# Patient Record
Sex: MALE | Race: WHITE | URBAN - METROPOLITAN AREA
[De-identification: names, ages, dates, MRNs, and addresses within clinical notes are randomized per-mention and may not be internally consistent; named-entity substitution may affect disease eponyms.]

---

## 2023-07-05 ENCOUNTER — EMERGENCY (EMERGENCY)
Facility: HOSPITAL | Age: 88
LOS: 0 days | Discharge: HOME | End: 2023-07-05
Attending: STUDENT IN AN ORGANIZED HEALTH CARE EDUCATION/TRAINING PROGRAM
Payer: MEDICARE

## 2023-07-05 VITALS
HEART RATE: 75 BPM | SYSTOLIC BLOOD PRESSURE: 181 MMHG | DIASTOLIC BLOOD PRESSURE: 85 MMHG | TEMPERATURE: 98 F | HEIGHT: 71 IN | OXYGEN SATURATION: 99 % | RESPIRATION RATE: 18 BRPM | WEIGHT: 190.04 LBS

## 2023-07-05 DIAGNOSIS — E78.5 HYPERLIPIDEMIA, UNSPECIFIED: ICD-10-CM

## 2023-07-05 DIAGNOSIS — I10 ESSENTIAL (PRIMARY) HYPERTENSION: ICD-10-CM

## 2023-07-05 DIAGNOSIS — M79.641 PAIN IN RIGHT HAND: ICD-10-CM

## 2023-07-05 DIAGNOSIS — Z23 ENCOUNTER FOR IMMUNIZATION: ICD-10-CM

## 2023-07-05 DIAGNOSIS — M89.8X3 OTHER SPECIFIED DISORDERS OF BONE, FOREARM: ICD-10-CM

## 2023-07-05 DIAGNOSIS — L03.113 CELLULITIS OF RIGHT UPPER LIMB: ICD-10-CM

## 2023-07-05 LAB
ALBUMIN SERPL ELPH-MCNC: 4.5 G/DL — SIGNIFICANT CHANGE UP (ref 3.5–5.2)
ALP SERPL-CCNC: 78 U/L — SIGNIFICANT CHANGE UP (ref 30–115)
ALT FLD-CCNC: 16 U/L — SIGNIFICANT CHANGE UP (ref 0–41)
ANION GAP SERPL CALC-SCNC: 9 MMOL/L — SIGNIFICANT CHANGE UP (ref 7–14)
AST SERPL-CCNC: 20 U/L — SIGNIFICANT CHANGE UP (ref 0–41)
BILIRUB SERPL-MCNC: 0.6 MG/DL — SIGNIFICANT CHANGE UP (ref 0.2–1.2)
BUN SERPL-MCNC: 22 MG/DL — HIGH (ref 10–20)
CALCIUM SERPL-MCNC: 9.9 MG/DL — SIGNIFICANT CHANGE UP (ref 8.4–10.5)
CHLORIDE SERPL-SCNC: 109 MMOL/L — SIGNIFICANT CHANGE UP (ref 98–110)
CO2 SERPL-SCNC: 23 MMOL/L — SIGNIFICANT CHANGE UP (ref 17–32)
CREAT SERPL-MCNC: 1 MG/DL — SIGNIFICANT CHANGE UP (ref 0.7–1.5)
EGFR: 73 ML/MIN/1.73M2 — SIGNIFICANT CHANGE UP
GLUCOSE SERPL-MCNC: 104 MG/DL — HIGH (ref 70–99)
HCT VFR BLD CALC: 42.1 % — SIGNIFICANT CHANGE UP (ref 42–52)
HGB BLD-MCNC: 14 G/DL — SIGNIFICANT CHANGE UP (ref 14–18)
MCHC RBC-ENTMCNC: 30.4 PG — SIGNIFICANT CHANGE UP (ref 27–31)
MCHC RBC-ENTMCNC: 33.3 G/DL — SIGNIFICANT CHANGE UP (ref 32–37)
MCV RBC AUTO: 91.5 FL — SIGNIFICANT CHANGE UP (ref 80–94)
NRBC # BLD: 0 /100 WBCS — SIGNIFICANT CHANGE UP (ref 0–0)
PLATELET # BLD AUTO: 190 K/UL — SIGNIFICANT CHANGE UP (ref 130–400)
PMV BLD: 10.8 FL — HIGH (ref 7.4–10.4)
POTASSIUM SERPL-MCNC: 4.9 MMOL/L — SIGNIFICANT CHANGE UP (ref 3.5–5)
POTASSIUM SERPL-SCNC: 4.9 MMOL/L — SIGNIFICANT CHANGE UP (ref 3.5–5)
PROT SERPL-MCNC: 6.7 G/DL — SIGNIFICANT CHANGE UP (ref 6–8)
RBC # BLD: 4.6 M/UL — LOW (ref 4.7–6.1)
RBC # FLD: 14.1 % — SIGNIFICANT CHANGE UP (ref 11.5–14.5)
SODIUM SERPL-SCNC: 141 MMOL/L — SIGNIFICANT CHANGE UP (ref 135–146)
WBC # BLD: 8.05 K/UL — SIGNIFICANT CHANGE UP (ref 4.8–10.8)
WBC # FLD AUTO: 8.05 K/UL — SIGNIFICANT CHANGE UP (ref 4.8–10.8)

## 2023-07-05 PROCEDURE — 90715 TDAP VACCINE 7 YRS/> IM: CPT | Mod: GY

## 2023-07-05 PROCEDURE — 36415 COLL VENOUS BLD VENIPUNCTURE: CPT

## 2023-07-05 PROCEDURE — 99284 EMERGENCY DEPT VISIT MOD MDM: CPT | Mod: FS

## 2023-07-05 PROCEDURE — 73130 X-RAY EXAM OF HAND: CPT | Mod: RT

## 2023-07-05 PROCEDURE — 80053 COMPREHEN METABOLIC PANEL: CPT

## 2023-07-05 PROCEDURE — 90471 IMMUNIZATION ADMIN: CPT

## 2023-07-05 PROCEDURE — 99283 EMERGENCY DEPT VISIT LOW MDM: CPT | Mod: 25

## 2023-07-05 PROCEDURE — 85027 COMPLETE CBC AUTOMATED: CPT

## 2023-07-05 PROCEDURE — 73130 X-RAY EXAM OF HAND: CPT | Mod: 26,RT

## 2023-07-05 RX ORDER — TETANUS TOXOID, REDUCED DIPHTHERIA TOXOID AND ACELLULAR PERTUSSIS VACCINE, ADSORBED 5; 2.5; 8; 8; 2.5 [IU]/.5ML; [IU]/.5ML; UG/.5ML; UG/.5ML; UG/.5ML
0.5 SUSPENSION INTRAMUSCULAR ONCE
Refills: 0 | Status: COMPLETED | OUTPATIENT
Start: 2023-07-05 | End: 2023-07-05

## 2023-07-05 RX ORDER — CEPHALEXIN 500 MG
1 CAPSULE ORAL
Qty: 28 | Refills: 0
Start: 2023-07-05 | End: 2023-07-11

## 2023-07-05 RX ADMIN — TETANUS TOXOID, REDUCED DIPHTHERIA TOXOID AND ACELLULAR PERTUSSIS VACCINE, ADSORBED 0.5 MILLILITER(S): 5; 2.5; 8; 8; 2.5 SUSPENSION INTRAMUSCULAR at 13:27

## 2023-07-05 NOTE — ED PROVIDER NOTE - CARE PROVIDER_API CALL
Sadi Martin  Orthopaedic Surgery  [address] [place] [address]  Phone: [phone]  Fax: [phone]  Follow Up Time:

## 2023-07-05 NOTE — ED PROVIDER NOTE - PHYSICAL EXAMINATION
CONST: Well appearing in NAD  CARD: Normal S1 S2; Normal rate and rhythm  RESP: Equal BS B/L, No wheezes, rhonchi or rales. No distress  MS: Normal ROM in all extremities.   SKIN: Mild erythema and swelling to dorsum of R hand adjacent to a well healing linear abrasion. No fluctuance. No lymphangitis  NEURO: A&Ox3, No focal deficits. Strength 5/5 with no sensory deficits.

## 2023-07-05 NOTE — ED PROVIDER NOTE - ATTENDING APP SHARED VISIT CONTRIBUTION OF CARE
88-year-old male with a past medical history significant for hypertension and hyperlipidemia who presents with right hand pain.  Patient states that approximately 3 days ago he cut the radial dorsum of the right hands with a metal object.  Yesterday noticed some swelling and redness to the area.  However patient denies any discharge numbness tingling fevers chills or any other medical complaints.    VITAL SIGNS: I have reviewed nursing notes and confirm.  CONSTITUTIONAL: non-toxic, well appearing  SKIN: no rash, no petechiae.  EYES: EOMI, pink conjunctiva, anicteric  ENT: tongue midline, no exudates, MMM  NECK: Supple; no meningismus, no JVD  RESP:no respiratory distress  EXT: Normal ROM x4. No edema. No calves tenderness . RUE: there is a healing wound to the dorsum of the hand, redness noted, however, no crepitus/streaking or fluctuance. full rom at the digits, wrist, elbow, shoulder. radial pulse +2   NEURO: Alert, oriented. CN2-12 intact, equal strength bilaterally, nl gait.  PSYCH: Cooperative, appropriate.    88-year-old male that presents with right hand pain concern for cellulitis no streaking.  Will obtain labs and imaging.  Patient instructed to return in 48 hours for wound check will DC with p.o. antibiotics.

## 2023-07-05 NOTE — ED PROVIDER NOTE - PATIENT PORTAL LINK FT
You can access the FollowMyHealth Patient Portal offered by [facility] by registering at the following website: [email or web address]. By joining [employer]’s FollowMyHealth portal, you will also be able to view your health information using other applications (apps) compatible with our system.

## 2023-07-05 NOTE — ED PROVIDER NOTE - NSFOLLOWUPINSTRUCTIONS_ED_ALL_ED_FT
PLEASE RETURN IN 48 HOURS FOR WOUND CHECK. CAN ASK FOR DR. SAINZ.     Cellulitis    Cellulitis is a skin infection caused by bacteria. This condition occurs most often in the arms and lower legs but can occur anywhere over the body. Symptoms include redness, swelling, warm skin, tenderness, and chills/fever. If you were prescribed an antibiotic medicine, take it as told by your health care provider. Do not stop taking the antibiotic even if you start to feel better.    SEEK IMMEDIATE MEDICAL CARE IF YOU HAVE ANY OF THE FOLLOWING SYMPTOMS: worsening fever, red streaks coming from affected area, vomiting or diarrhea, or dizziness/lightheadedness. or if symptoms dont improve

## 2023-07-05 NOTE — ED PROVIDER NOTE - CLINICAL SUMMARY MEDICAL DECISION MAKING FREE TEXT BOX
88-year-old male that presents with right hand pain concern for cellulitis no streaking.  Will obtain labs and imaging.  Patient instructed to return in 48 hours for wound check will DC with p.o. antibiotics. Labs were ordered and reviewed.  Imaging was ordered and reviewed by me.  Appropriate medications for patient's presenting complaints were ordered and effects were reassessed.  Patient's records (prior hospital, ED visit, and/or nursing home notes if available) were reviewed.  Additional history was obtained from EMS, family, and/or PCP (where available).  Escalation to admission/observation was considered.   However patient feels much better and is comfortable with discharge.  Appropriate follow-up was arranged.     I have discussed the discharge plan with the patient. The patient agrees with the plan, as discussed.  The patient understands Emergency Department diagnosis is a preliminary diagnosis often based on limited information and that the patient must adhere to the follow-up plan as discussed.  The patient understands that if the symptoms worsen or if prescribed medications do not have the desired/planned effect that the patient may return to the Emergency Department at any time for further evaluation and treatment.

## 2023-07-05 NOTE — ED PROVIDER NOTE - OBJECTIVE STATEMENT
88-year-old male with a past medical history significant for hypertension and hyperlipidemia who presents with right hand pain.  Patient states that approximately 3 days ago he cut the back of his R hand with a metal object while outside working in yard. Yesterday noticed mild swelling and redness to the area and presented today for evaluation for possible infection. He is L hand dominant. Reports FROM of R hand and no pain upon grasp or palpation.  However patient denies any discharge numbness tingling fevers chills or any other medical complaints.

## 2023-07-07 ENCOUNTER — EMERGENCY (EMERGENCY)
Facility: HOSPITAL | Age: 88
LOS: 0 days | Discharge: HOME | End: 2023-07-07
Attending: STUDENT IN AN ORGANIZED HEALTH CARE EDUCATION/TRAINING PROGRAM
Payer: MEDICARE

## 2023-07-07 VITALS
OXYGEN SATURATION: 98 % | SYSTOLIC BLOOD PRESSURE: 135 MMHG | RESPIRATION RATE: 18 BRPM | DIASTOLIC BLOOD PRESSURE: 83 MMHG | TEMPERATURE: 98 F | HEART RATE: 75 BPM | WEIGHT: 190.04 LBS | HEIGHT: 71 IN

## 2023-07-07 DIAGNOSIS — M79.89 OTHER SPECIFIED SOFT TISSUE DISORDERS: ICD-10-CM

## 2023-07-07 DIAGNOSIS — E78.5 HYPERLIPIDEMIA, UNSPECIFIED: ICD-10-CM
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
## 2023-07-07 DIAGNOSIS — S61.401A UNSPECIFIED OPEN WOUND OF RIGHT HAND, INITIAL ENCOUNTER: ICD-10-CM

## 2023-07-07 DIAGNOSIS — I10 ESSENTIAL (PRIMARY) HYPERTENSION: ICD-10-CM

## 2023-07-07 LAB
ANION GAP SERPL CALC-SCNC: 10 MMOL/L — SIGNIFICANT CHANGE UP (ref 7–14)
BASE EXCESS BLDV CALC-SCNC: -2.4 MMOL/L — LOW (ref -2–3)
BUN SERPL-MCNC: 22 MG/DL — HIGH (ref 10–20)
CA-I SERPL-SCNC: 1.28 MMOL/L — SIGNIFICANT CHANGE UP (ref 1.15–1.33)
CALCIUM SERPL-MCNC: 10.2 MG/DL — SIGNIFICANT CHANGE UP (ref 8.4–10.5)
CHLORIDE SERPL-SCNC: 107 MMOL/L — SIGNIFICANT CHANGE UP (ref 98–110)
CO2 SERPL-SCNC: 22 MMOL/L — SIGNIFICANT CHANGE UP (ref 17–32)
CREAT SERPL-MCNC: 1.4 MG/DL — SIGNIFICANT CHANGE UP (ref 0.7–1.5)
EGFR: 49 ML/MIN/1.73M2 — LOW
GAS PNL BLDV: 136 MMOL/L — SIGNIFICANT CHANGE UP (ref 136–145)
GAS PNL BLDV: SIGNIFICANT CHANGE UP
GLUCOSE SERPL-MCNC: 102 MG/DL — HIGH (ref 70–99)
HCO3 BLDV-SCNC: 23 MMOL/L — SIGNIFICANT CHANGE UP (ref 22–29)
HCT VFR BLD CALC: 43.2 % — SIGNIFICANT CHANGE UP (ref 42–52)
HCT VFR BLDA CALC: 44 % — SIGNIFICANT CHANGE UP (ref 39–51)
HGB BLD CALC-MCNC: 14.7 G/DL — SIGNIFICANT CHANGE UP (ref 12.6–17.4)
HGB BLD-MCNC: 14.3 G/DL — SIGNIFICANT CHANGE UP (ref 14–18)
LACTATE BLDV-MCNC: 0.9 MMOL/L — SIGNIFICANT CHANGE UP (ref 0.5–2)
MCHC RBC-ENTMCNC: 30.6 PG — SIGNIFICANT CHANGE UP (ref 27–31)
MCHC RBC-ENTMCNC: 33.1 G/DL — SIGNIFICANT CHANGE UP (ref 32–37)
MCV RBC AUTO: 92.5 FL — SIGNIFICANT CHANGE UP (ref 80–94)
NRBC # BLD: 0 /100 WBCS — SIGNIFICANT CHANGE UP (ref 0–0)
PCO2 BLDV: 42 MMHG — SIGNIFICANT CHANGE UP (ref 42–55)
PH BLDV: 7.35 — SIGNIFICANT CHANGE UP (ref 7.32–7.43)
PLATELET # BLD AUTO: 201 K/UL — SIGNIFICANT CHANGE UP (ref 130–400)
PMV BLD: 10.7 FL — HIGH (ref 7.4–10.4)
PO2 BLDV: 35 MMHG — SIGNIFICANT CHANGE UP
POTASSIUM BLDV-SCNC: 4.7 MMOL/L — SIGNIFICANT CHANGE UP (ref 3.5–5.1)
POTASSIUM SERPL-MCNC: 5.4 MMOL/L — HIGH (ref 3.5–5)
POTASSIUM SERPL-SCNC: 5.4 MMOL/L — HIGH (ref 3.5–5)
RBC # BLD: 4.67 M/UL — LOW (ref 4.7–6.1)
RBC # FLD: 14 % — SIGNIFICANT CHANGE UP (ref 11.5–14.5)
SAO2 % BLDV: 54.4 % — SIGNIFICANT CHANGE UP
SODIUM SERPL-SCNC: 139 MMOL/L — SIGNIFICANT CHANGE UP (ref 135–146)
WBC # BLD: 8.69 K/UL — SIGNIFICANT CHANGE UP (ref 4.8–10.8)
WBC # FLD AUTO: 8.69 K/UL — SIGNIFICANT CHANGE UP (ref 4.8–10.8)

## 2023-07-07 PROCEDURE — 73130 X-RAY EXAM OF HAND: CPT | Mod: RT

## 2023-07-07 PROCEDURE — 73130 X-RAY EXAM OF HAND: CPT | Mod: 26,RT

## 2023-07-07 PROCEDURE — 80048 BASIC METABOLIC PNL TOTAL CA: CPT

## 2023-07-07 PROCEDURE — 36415 COLL VENOUS BLD VENIPUNCTURE: CPT

## 2023-07-07 PROCEDURE — 99284 EMERGENCY DEPT VISIT MOD MDM: CPT | Mod: 25

## 2023-07-07 PROCEDURE — 82330 ASSAY OF CALCIUM: CPT

## 2023-07-07 PROCEDURE — 85018 HEMOGLOBIN: CPT

## 2023-07-07 PROCEDURE — 99285 EMERGENCY DEPT VISIT HI MDM: CPT

## 2023-07-07 PROCEDURE — 84295 ASSAY OF SERUM SODIUM: CPT

## 2023-07-07 PROCEDURE — 84132 ASSAY OF SERUM POTASSIUM: CPT

## 2023-07-07 PROCEDURE — 85027 COMPLETE CBC AUTOMATED: CPT

## 2023-07-07 PROCEDURE — 85014 HEMATOCRIT: CPT

## 2023-07-07 PROCEDURE — 83605 ASSAY OF LACTIC ACID: CPT

## 2023-07-07 PROCEDURE — 82803 BLOOD GASES ANY COMBINATION: CPT

## 2023-07-07 NOTE — ED PROVIDER NOTE - CLINICAL SUMMARY MEDICAL DECISION MAKING FREE TEXT BOX
88-year-old male who presents for wound check.   Will obtain imaging labs hand consult. pt evaluated and cleared by hand consult. Labs were ordered and reviewed.  Imaging was ordered and reviewed by me.  Appropriate medications for patient's presenting complaints were ordered and effects were reassessed.  Patient's records (prior hospital, ED visit, and/or nursing home notes if available) were reviewed.  Additional history was obtained from EMS, family, and/or PCP (where available).  Escalation to admission/observation was considered.  However patient feels much better and is comfortable with discharge.  Appropriate follow-up was arranged.    I have discussed the discharge plan with the patient. The patient agrees with the plan, as discussed.  The patient understands Emergency Department diagnosis is a preliminary diagnosis often based on limited information and that the patient must adhere to the follow-up plan as discussed.  The patient understands that if the symptoms worsen or if prescribed medications do not have the desired/planned effect that the patient may return to the Emergency Department at any time for further evaluation and treatment.

## 2023-07-07 NOTE — ED PROVIDER NOTE - CARE PROVIDER_API CALL
Javier Azevedo  Plastic Surgery  [address] [place] [address]  Phone: [phone]  Fax: [phone]  Follow Up Time: 4-6 Days

## 2023-07-07 NOTE — ED PROVIDER NOTE - ATTENDING CONTRIBUTION TO CARE
88-year-old male with a past medical history significant for hypertension hyperlipidemia who presents for wound follow-up.  Patient was seen on July 5 as he was having pain to the right hands and there was concern for cellulitis.  Patient returns for wound follow-up.  Patient denies any fevers chills however does state that he has been noticing worsening swelling to the dorsum of the hand.  Patient denies any numbness tingling or any other medical complaints.    VITAL SIGNS: I have reviewed nursing notes and confirm.  CONSTITUTIONAL: non-toxic, well appearing  SKIN: no rash, no petechiae.  EYES: EOMI, pink conjunctiva, anicteric  ENT: tongue midline, no exudates, MMM  NECK: Supple; no meningismus, no JVD  EXT: Normal ROM x4. No edema. No calves tenderness RUE: swelling noted to the dorsum of the hand, redness noted around healing wound, however, no crepitus/fluctuance, no streaking. radial pulse +2. sensation intact.   NEURO: Alert, oriented. CN2-12 intact, equal strength bilaterally, nl gait.  PSYCH: Cooperative, appropriate.      88-year-old male who presents for wound check.  Right hand appears to be more swollen from last visit.  Will obtain imaging labs hand consult reassess dispo pending

## 2023-07-07 NOTE — ED ADULT TRIAGE NOTE - CHIEF COMPLAINT QUOTE
Patient a+ox3 reports was here Wednesday for wound to right hand with redness and swelling and here today for it to be checked

## 2023-07-07 NOTE — CONSULT NOTE ADULT - SUBJECTIVE AND OBJECTIVE BOX
SURGERY CONSULT NOTE    Patient: REBECCA MARTINEZ , 88y ([birth year])Male   MRN: [id number]  Location: [facility] ED  Visit: 07-07-23 Emergency  Date: 07-07-23 @ 16:24    HPI: 88M w/ PMH HTN, HLD, initially presented to the ED on 7/5 complaining of a R dorsal hand wound from an injury originally sustained 6/30 when he cut it on a metal object.  Originally seen in ED on 7/5 and found to have a 2cm wound with mild swelling, no erythema, sent home on abx and told to followup today for wound check    PAST MEDICAL & SURGICAL HISTORY:      Home Medications:        VITALS:  T(F): 98 (07-07-23 @ 11:52), Max: 98 (07-07-23 @ 11:52)  HR: 75 (07-07-23 @ 11:52) (75 - 75)  BP: 135/83 (07-07-23 @ 11:52) (135/83 - 135/83)  RR: 18 (07-07-23 @ 11:52) (18 - 18)  SpO2: 98% (07-07-23 @ 11:52) (98% - 98%)    PHYSICAL EXAM:  General: NAD, AAOx3  Cardiac: RRR  Respiratory: Unlabored breathing at rest  Abdomen: Soft, non-disended, non-tender  Musculoskeletal: R hand with dorsal surface laceration approx 2cm healing via 2/2 intention, mild edema, no erythema, nontender, FROM.  Neuro: Sensation grossly intact and equal throughout, no focal deficits  Skin: Warm/dry, normal color, no jaundice      MEDICATIONS  (STANDING):    MEDICATIONS  (PRN):      LAB/STUDIES:                        14.3   8.69  )-----------( 201      ( 07 Jul 2023 13:42 )             43.2     07-07    139  |  107  |  22<H>  ----------------------------<  102<H>  5.4<H>   |  22  |  1.4    Ca    10.2      07 Jul 2023 13:42          Urinalysis Basic - ( 07 Jul 2023 13:42 )    Color: x / Appearance: x / SG: x / pH: x  Gluc: 102 mg/dL / Ketone: x  / Bili: x / Urobili: x   Blood: x / Protein: x / Nitrite: x   Leuk Esterase: x / RBC: x / WBC x   Sq Epi: x / Non Sq Epi: x / Bacteria: x                  IMAGING:

## 2023-07-07 NOTE — ED PROVIDER NOTE - OBJECTIVE STATEMENT
88-year-old male with a past medical history significant for hypertension, hyperlipidemia who presents for wound follow-up.  Patient was seen on 7/5 for right hand cellulitis.  Patient returns for wound follow-up as per physician recommendation.  Patient denies any fevers and chills, weakness, numbness, wound discharge. States he has been noticing worsening swelling to the dorsum of the hand.  Patient denies any numbness tingling or any other medical complaints.

## 2023-07-07 NOTE — CONSULT NOTE ADULT - ASSESSMENT
88M w/ PMH HTN, HLD, initially presented to the ED on 7/5 complaining of a R dorsal hand wound from an injury originally sustained 6/30 when he cut it on a metal object.  Originally seen in ED on 7/5 and found to have a 2cm wound with mild swelling, no erythema, sent home on abx and told to followup today for wound check.    Plan:   -No acute surgical intervention   -Hand appears well healing, no evidence of infection   -Recommend continue course of abx and followup in office with Dr. Azevedo next week.  Return precautions explained to patient.

## 2023-07-07 NOTE — ED PROVIDER NOTE - PHYSICAL EXAMINATION
VITAL SIGNS: I have reviewed nursing notes and confirm.  CONSTITUTIONAL: non-toxic, well appearing  SKIN: no rash, no petechiae.  EYES: EOMI, pink conjunctiva, anicteric  ENT: tongue midline, no exudates, MMM  NECK: Supple; no meningismus, no JVD  EXT: Normal ROM x4. No edema. No calves tenderness RUE: swelling noted to the dorsum of the hand, redness noted around healing wound, however, no crepitus/fluctuance, no streaking. radial pulse +2. sensation intact.   NEURO: Alert, oriented. CN2-12 intact, equal strength bilaterally, nl gait.  PSYCH: Cooperative, appropriate.

## 2023-07-07 NOTE — ED PROVIDER NOTE - PATIENT PORTAL LINK FT
You can access the FollowMyHealth Patient Portal offered by [facility] by registering at the following website: [email or web address]. By joining [employer]’s FollowMyHealth portal, you will also be able to view your health information using other applications (apps) compatible with our system.

## 2023-07-07 NOTE — ED PROVIDER NOTE - NSFOLLOWUPINSTRUCTIONS_ED_ALL_ED_FT
Our Emergency Department Referral Coordinators will be reaching out to you in the next 24-48 hours from 9:00am to 5:00pm with a follow up appointment. Please expect a phone call from the hospital in that time frame. If you do not receive a call or if you have any questions or concerns, you can reach them at   [phone]

## 2023-07-11 ENCOUNTER — APPOINTMENT (OUTPATIENT)
Dept: ORTHOPEDIC SURGERY | Facility: CLINIC | Age: 88
End: 2023-07-11
Payer: MEDICARE

## 2023-07-11 VITALS — BODY MASS INDEX: 26.6 KG/M2 | WEIGHT: 190 LBS | HEIGHT: 71 IN

## 2023-07-11 DIAGNOSIS — L03.113 CELLULITIS OF RIGHT UPPER LIMB: ICD-10-CM

## 2023-07-11 PROBLEM — Z00.00 ENCOUNTER FOR PREVENTIVE HEALTH EXAMINATION: Status: ACTIVE | Noted: 2023-07-11

## 2023-07-11 PROCEDURE — 99203 OFFICE O/P NEW LOW 30 MIN: CPT

## 2023-07-11 NOTE — HISTORY OF PRESENT ILLNESS
[de-identified] : Patient is a 88-year-old male who reports to the office for evaluation of his right hand pain.  He accidentally cut the dorsal aspect of his right hand on a chair as it was falling.  He was sent on [facility] where they performed x-rays and confirmed that the patient has no acute fractures.  They were concerned for a bony lesion in his wrist.  He was diagnosed with cellulitis and was prescribed Bactrim and Keflex which he has been taking as directed since.  Certain range of motion and palpating certain areas of the hand still aggravate the patient's pain at times.  He states that the antibiotics has helped with the redness and swelling that he originally had.  He is left-hand dominant.  Denies any numbness or tingling.

## 2023-07-11 NOTE — PHYSICAL EXAM
[Right] : right hand [5___] : pinch 5[unfilled]/5 [] : good capillary refill in all fingers [FreeTextEntry3] : Mild dorsal hand swelling and erythema noted with approximately 1 cm healing laceration with scabbing noted.  Slightly increased warmth to touch. [FreeTextEntry9] : Minimal discomfort with ROM

## 2023-07-11 NOTE — IMAGING
[de-identified] : Right hand x-rays taken at [facility] were reviewed in office today.  It revealed no obvious fractures, subluxations, or dislocations.  Questionable bony cyst/lucency noted at distal aspect of right radius.  Otherwise, no other significant abnormalities were seen.

## 2023-07-11 NOTE — DISCUSSION/SUMMARY
[de-identified] : Right wrist MRI ordered for further evaluation of bony cyst on the right distal radius.  Patient was advised to call the office a few days after getting the MRI done to discuss results over the phone.\par \par Patient's right hand cellulitis has been improving.  He will continue taking Bactrim and Keflex as directed.  The patient will follow-up in 2-3 weeks for further evaluation.  All of the patient's questions/concerns were answered in detail.\par \par

## 2023-07-17 ENCOUNTER — APPOINTMENT (OUTPATIENT)
Dept: MRI IMAGING | Facility: CLINIC | Age: 88
End: 2023-07-17
Payer: MEDICARE

## 2023-07-17 PROCEDURE — 73221 MRI JOINT UPR EXTREM W/O DYE: CPT | Mod: RT,MH

## 2023-08-07 ENCOUNTER — APPOINTMENT (OUTPATIENT)
Dept: ORTHOPEDIC SURGERY | Facility: CLINIC | Age: 88
End: 2023-08-07
Payer: MEDICARE

## 2023-08-07 VITALS — WEIGHT: 190 LBS | HEIGHT: 71 IN | BODY MASS INDEX: 26.6 KG/M2

## 2023-08-07 DIAGNOSIS — M85.69: ICD-10-CM

## 2023-08-07 PROCEDURE — 99213 OFFICE O/P EST LOW 20 MIN: CPT

## 2023-08-07 NOTE — DATA REVIEWED
[FreeTextEntry1] : Reviewed his radiographs with documented the cystic area in the distal forearm and the radius  I reviewed the MRI I do see the area in question I am not sure if it evaluated well enough for comment I discussed with the radiologist

## 2023-08-07 NOTE — HISTORY OF PRESENT ILLNESS
[de-identified] : 88-year-old male had pain and discomfort in the right wrist after injury.  Right now he has no pain or discomfort in the right wrist and hand.  And he comes in for the evaluation today.  He had an MRI done to evaluate a lesion in the distal forearm

## 2023-08-07 NOTE — PHYSICAL EXAM
[de-identified] : Patient has no swelling erythema ecchymoses or abrasions.  Patient has full range of motion of the wrist and hand.  There is no tenderness to palpation.

## 2023-08-14 ENCOUNTER — NON-APPOINTMENT (OUTPATIENT)
Age: 88
End: 2023-08-14

## 2023-08-27 ENCOUNTER — OUTPATIENT (OUTPATIENT)
Dept: OUTPATIENT SERVICES | Facility: HOSPITAL | Age: 88
LOS: 1 days | End: 2023-08-27
Payer: MEDICARE

## 2023-08-27 DIAGNOSIS — Z00.8 ENCOUNTER FOR OTHER GENERAL EXAMINATION: ICD-10-CM

## 2023-08-27 DIAGNOSIS — M25.50 PAIN IN UNSPECIFIED JOINT: ICD-10-CM

## 2023-08-27 PROCEDURE — A9579: CPT

## 2023-08-27 PROCEDURE — 73720 MRI LWR EXTREMITY W/O&W/DYE: CPT | Mod: 26,RT,MH

## 2023-08-27 PROCEDURE — 73720 MRI LWR EXTREMITY W/O&W/DYE: CPT | Mod: RT

## 2023-08-28 DIAGNOSIS — M25.50 PAIN IN UNSPECIFIED JOINT: ICD-10-CM
